# Patient Record
Sex: FEMALE | Race: WHITE | NOT HISPANIC OR LATINO
[De-identification: names, ages, dates, MRNs, and addresses within clinical notes are randomized per-mention and may not be internally consistent; named-entity substitution may affect disease eponyms.]

---

## 2017-09-29 ENCOUNTER — APPOINTMENT (OUTPATIENT)
Dept: OPHTHALMOLOGY | Facility: CLINIC | Age: 72
End: 2017-09-29
Payer: MEDICARE

## 2017-09-29 DIAGNOSIS — H59.813 CHORIORETINAL SCARS AFTER SURGERY FOR DETACHMENT, BILATERAL: ICD-10-CM

## 2017-09-29 DIAGNOSIS — H35.413 LATTICE DEGENERATION OF RETINA, BILATERAL: ICD-10-CM

## 2017-09-29 DIAGNOSIS — H43.813 VITREOUS DEGENERATION, BILATERAL: ICD-10-CM

## 2017-09-29 DIAGNOSIS — D31.32 BENIGN NEOPLASM OF LEFT CHOROID: ICD-10-CM

## 2017-09-29 PROCEDURE — 92014 COMPRE OPH EXAM EST PT 1/>: CPT

## 2017-09-29 PROCEDURE — 92134 CPTRZ OPH DX IMG PST SGM RTA: CPT

## 2017-09-29 PROCEDURE — 92226: CPT | Mod: LT

## 2019-09-26 ENCOUNTER — APPOINTMENT (OUTPATIENT)
Dept: PULMONOLOGY | Facility: CLINIC | Age: 74
End: 2019-09-26
Payer: MEDICARE

## 2019-09-26 VITALS
DIASTOLIC BLOOD PRESSURE: 80 MMHG | SYSTOLIC BLOOD PRESSURE: 110 MMHG | HEIGHT: 63 IN | WEIGHT: 100 LBS | OXYGEN SATURATION: 95 % | HEART RATE: 89 BPM | BODY MASS INDEX: 17.72 KG/M2 | TEMPERATURE: 99.4 F

## 2019-09-26 DIAGNOSIS — R05 COUGH: ICD-10-CM

## 2019-09-26 DIAGNOSIS — J20.8 ACUTE BRONCHITIS DUE TO OTHER SPECIFIED ORGANISMS: ICD-10-CM

## 2019-09-26 DIAGNOSIS — R09.82 POSTNASAL DRIP: ICD-10-CM

## 2019-09-26 PROCEDURE — 94010 BREATHING CAPACITY TEST: CPT

## 2019-09-26 PROCEDURE — 99204 OFFICE O/P NEW MOD 45 MIN: CPT | Mod: 25

## 2019-09-26 RX ORDER — ALBUTEROL SULFATE 90 UG/1
108 (90 BASE) INHALANT RESPIRATORY (INHALATION)
Qty: 1 | Refills: 0 | Status: ACTIVE | COMMUNITY
Start: 2019-09-26 | End: 1900-01-01

## 2019-09-26 NOTE — ASSESSMENT
[FreeTextEntry1] : 74F with acute on chronic sinusitis and likely viral bronchitis. Spirometry is normal and just finished abx 2 weeks ago. Few rhonchi on exam c/w phlegm in airways. Would recommend albuterol ,Mucinex, steaming and sinus lavage with Flonase. ENT follow up as well.

## 2019-09-26 NOTE — PHYSICAL EXAM
[General Appearance - Well Developed] : well developed [General Appearance - Well Nourished] : well nourished [Normal Conjunctiva] : the conjunctiva exhibited no abnormalities [Normal Oropharynx] : normal oropharynx [Neck Appearance] : the appearance of the neck was normal [Heart Rate And Rhythm] : heart rate and rhythm were normal [Heart Sounds] : normal S1 and S2 [Murmurs] : no murmurs present [] : no respiratory distress [Arterial Pulses Normal] : the arterial pulses were normal [Respiration, Rhythm And Depth] : normal respiratory rhythm and effort [Abnormal Walk] : normal gait [Nail Clubbing] : no clubbing of the fingernails [Cyanosis, Localized] : no localized cyanosis [Skin Color & Pigmentation] : normal skin color and pigmentation [Oriented To Time, Place, And Person] : oriented to person, place, and time [Skin Turgor] : normal skin turgor [Impaired Insight] : insight and judgment were intact

## 2019-09-26 NOTE — HISTORY OF PRESENT ILLNESS
[FreeTextEntry1] : 74F here with persistent cough, with some phlegm, mostly light in color. She had bronchitis treated 3 times this year, last abx 2 weeks ago. Also received prednisone then. Felt better and last week wke up with a sore throat and now with cough, worst in the morning and feels sinus congestion and postnasal drip and sinus pressure. No fevers or chills, no sob, no wheezing.

## 2021-03-24 ENCOUNTER — APPOINTMENT (OUTPATIENT)
Dept: ENDOCRINOLOGY | Facility: CLINIC | Age: 76
End: 2021-03-24

## 2021-08-04 ENCOUNTER — APPOINTMENT (OUTPATIENT)
Dept: HEART AND VASCULAR | Facility: CLINIC | Age: 76
End: 2021-08-04
Payer: MEDICARE

## 2021-08-04 ENCOUNTER — NON-APPOINTMENT (OUTPATIENT)
Age: 76
End: 2021-08-04

## 2021-08-04 VITALS
TEMPERATURE: 98 F | BODY MASS INDEX: 16.3 KG/M2 | RESPIRATION RATE: 14 BRPM | HEIGHT: 63 IN | OXYGEN SATURATION: 97 % | WEIGHT: 92 LBS

## 2021-08-04 VITALS — SYSTOLIC BLOOD PRESSURE: 110 MMHG | HEART RATE: 96 BPM | DIASTOLIC BLOOD PRESSURE: 70 MMHG

## 2021-08-04 DIAGNOSIS — R94.31 ABNORMAL ELECTROCARDIOGRAM [ECG] [EKG]: ICD-10-CM

## 2021-08-04 DIAGNOSIS — Z87.891 PERSONAL HISTORY OF NICOTINE DEPENDENCE: ICD-10-CM

## 2021-08-04 DIAGNOSIS — R53.82 CHRONIC FATIGUE, UNSPECIFIED: ICD-10-CM

## 2021-08-04 PROCEDURE — 93000 ELECTROCARDIOGRAM COMPLETE: CPT

## 2021-08-04 PROCEDURE — 99203 OFFICE O/P NEW LOW 30 MIN: CPT

## 2021-08-04 NOTE — HISTORY OF PRESENT ILLNESS
[FreeTextEntry1] : 75 F presents for fatigue for a few months.  No cp or sob.  No palpitations. No dizziness or falls.  Started a folate supplement around this time.  No other medication changes.  Can walk miles without limitations. \par \par Former Heavy Smoker - quit year \par No EtOH use\par Fhx: Father  of MI age 65\par

## 2021-08-04 NOTE — ASSESSMENT
[FreeTextEntry1] : 75 F \par \par Chronic Fatigue - good exercise tolerance, no chest pain or sob.  unclear etiology.  no signs of heart failure.  may be related to her depression.  \par Former Heavy Smoker \par \par EKG: nsr, < 1mm inferolateral STD\par \par - ECHO\par - exercise stress test given abnormal ekg, heavy smoking hx\par

## 2021-08-20 ENCOUNTER — APPOINTMENT (OUTPATIENT)
Dept: HEART AND VASCULAR | Facility: CLINIC | Age: 76
End: 2021-08-20
Payer: MEDICARE

## 2021-08-20 PROCEDURE — 93306 TTE W/DOPPLER COMPLETE: CPT

## 2021-08-25 ENCOUNTER — APPOINTMENT (OUTPATIENT)
Dept: HEART AND VASCULAR | Facility: CLINIC | Age: 76
End: 2021-08-25

## 2021-09-15 ENCOUNTER — APPOINTMENT (OUTPATIENT)
Dept: HEART AND VASCULAR | Facility: CLINIC | Age: 76
End: 2021-09-15
Payer: MEDICARE

## 2021-09-15 DIAGNOSIS — R07.9 CHEST PAIN, UNSPECIFIED: ICD-10-CM

## 2021-09-15 PROCEDURE — 93015 CV STRESS TEST SUPVJ I&R: CPT

## 2023-06-13 ENCOUNTER — OUTPATIENT (OUTPATIENT)
Dept: OUTPATIENT SERVICES | Facility: HOSPITAL | Age: 78
LOS: 1 days | End: 2023-06-13
Payer: MEDICARE

## 2023-06-13 ENCOUNTER — APPOINTMENT (OUTPATIENT)
Dept: RADIOLOGY | Facility: CLINIC | Age: 78
End: 2023-06-13

## 2023-06-13 PROCEDURE — 72100 X-RAY EXAM L-S SPINE 2/3 VWS: CPT | Mod: 26

## 2023-06-13 PROCEDURE — 72040 X-RAY EXAM NECK SPINE 2-3 VW: CPT | Mod: 26
